# Patient Record
(demographics unavailable — no encounter records)

---

## 2025-07-14 NOTE — ASSESSMENT
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 We reviewed her pathology and reviewed the significance of ER 0 to low negative breast cancer which behaves like a clinical triple negative breast cancer. While the tumor size was small, we explained that the tumor biology will usually warrant adjuvant treatment like chemotherapy to reduce her risk of breast cancer recurrence. We reviewed that the Oncotype would review her risk of breast cancer distant recurrence over the next 9 years with her being on endocrine therapy. We reviewed her concerns about chemotherapy and explained supportive measures would be in place to lessen side effects from treatment. We gave her daughter written information about TC every 3 weeks for 4 times. At this time, she is willing to have us review Oncotype results once they return. We reviewed radiation therapy to the lumpectomy site which she is amenable to: she wants to have at [facility] and will send referral to radiation oncology. We reviewed ASCO 2024 data showing even low ER positive may benefit from endocrine therapy and would recommend endocrine therapy once a day after radiation. We reviewed their questions about immunotherapy and Parp inhibitor. Explained that Keytruda is currently only FDA approved for TNBC that is greater than 2 cm or LN positive. Also reviewed Lynparza data for pts with TNBC more than 2 cm or LN positive. Questions answered to her satisfaction. She is amenable to consider estrogen blocking therapy. We will call her and her daughter once Oncotype results return.

## 2025-07-14 NOTE — CONSULT LETTER
[Dear  ___] : Dear  [unfilled], [Consult Letter:] : I had the pleasure of evaluating your patient, [unfilled]. [( Thank you for referring [unfilled] for consultation for _____ )] : Thank you for referring [unfilled] for consultation for [unfilled] [Please see my note below.] : Please see my note below. [Consult Closing:] : Thank you very much for allowing me to participate in the care of this patient.  If you have any questions, please do not hesitate to contact me. [Sincerely,] : Sincerely, [FreeTextEntry2] : Alison Shafer MD [address] [place] [address], [address] [place] [phone] [FreeTextEntry3] : Martinez Alfonso MD Attending [facility]  [DrErnesto  ___] : Dr. PAEZ

## 2025-07-14 NOTE — PHYSICAL EXAM
[Fully active, able to carry on all pre-disease performance without restriction] : Status 0 - Fully active, able to carry on all pre-disease performance without restriction [Normal] : affect appropriate [de-identified] : Right upper outer lumpectomy and SLNB scar healed; no calor or erythema  [de-identified] : strengh BUE and BLE 5/5

## 2025-07-14 NOTE — HISTORY OF PRESENT ILLNESS
[de-identified] : Age 78: right breast cancer  Screen detected: had screening mammogram done on 5/13/2025 which showed subtle spiculations seen in the superior aspect between 11:00 and 12:00. She had right breast 11:00 u/s guided biopsy done on 5/22/2025. The pathology showed invasive ductal carcinoma poorly differentiated, SBR 8/9, ER negative, OK negative, Her2 negative, Ki67 30%. MRI of the breast done on 5/27/2025 showed right 1.1 cm enhancing mass consistent with biopsy proven carcinoma and no axillary adenopathy. She had genetic testing earlier due to FH of breast cancer (sister, brother and daughter) and has BRCA 2 mutation on surveillance. She was interested in breast conservation surgery and underwent right lumpectomy with SLNB with Dr Shafer on 6/25/2025. The pathology showed invasive ductal carcinoma measuring 9 mm, DCIS measuring 12 mm, and negative SLNB (0/2). IHC showed low ER positive 1 to 10%, OK negative, Her2 negative. Oncotype sent by Dr Shafer.  [de-identified] :  invasive ductal carcinoma poorly differentiated ER negative, SC negative, Her2 negative [de-identified] : Genetic testing: BRCA 2 positive  [de-identified] : She is present to review adjuvant treatment recommendations. She tolerated surgery well. Enjoys good health and lives with her . Denies any recent falls or unsteadiness. No HA or cough or back pain. She has had family members who had past chemotherapy and does not want chemotherapy. She wants to know what additional options can be considered for her case. She will be going on vacation and will return 8/4/2025. Her daughter is helping her with any visits and also interested in radiation oncology evaluation.

## 2025-07-14 NOTE — CONSULT LETTER
[Dear  ___] : Dear  [unfilled], [Consult Letter:] : I had the pleasure of evaluating your patient, [unfilled]. [( Thank you for referring [unfilled] for consultation for _____ )] : Thank you for referring [unfilled] for consultation for [unfilled] [Please see my note below.] : Please see my note below. [Consult Closing:] : Thank you very much for allowing me to participate in the care of this patient.  If you have any questions, please do not hesitate to contact me. [Sincerely,] : Sincerely, [FreeTextEntry2] : Alison Shafer MD [address] [place] [address], [address] [place] [phone] [FreeTextEntry3] : Martinez Alfonso MD Attending [facility]  [DrErnesto  ___] : Dr. PAEZ

## 2025-07-14 NOTE — ASSESSMENT
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 We reviewed her pathology and reviewed the significance of ER 0 to low negative breast cancer which behaves like a clinical triple negative breast cancer. While the tumor size was small, we explained that the tumor biology will usually warrant adjuvant treatment like chemotherapy to reduce her risk of breast cancer recurrence. We reviewed that the Oncotype would review her risk of breast cancer distant recurrence over the next 9 years with her being on endocrine therapy. We reviewed her concerns about chemotherapy and explained supportive measures would be in place to lessen side effects from treatment. We gave her daughter written information about TC every 3 weeks for 4 times. At this time, she is willing to have us review Oncotype results once they return. We reviewed radiation therapy to the lumpectomy site which she is amenable to: she wants to have at [facility] and will send referral to radiation oncology. We reviewed ASCO 2024 data showing even low ER positive may benefit from endocrine therapy and would recommend endocrine therapy once a day after radiation. We reviewed their questions about immunotherapy and Parp inhibitor. Explained that Keytruda is currently only FDA approved for TNBC that is greater than 2 cm or LN positive. Also reviewed Lynparza data for pts with TNBC more than 2 cm or LN positive. Questions answered to her satisfaction. She is amenable to consider estrogen blocking therapy. We will call her and her daughter once Oncotype results return.

## 2025-07-14 NOTE — PHYSICAL EXAM
[Fully active, able to carry on all pre-disease performance without restriction] : Status 0 - Fully active, able to carry on all pre-disease performance without restriction [Normal] : affect appropriate [de-identified] : Right upper outer lumpectomy and SLNB scar healed; no calor or erythema  [de-identified] : strengh BUE and BLE 5/5

## 2025-07-14 NOTE — HISTORY OF PRESENT ILLNESS
[de-identified] : Age 78: right breast cancer  Screen detected: had screening mammogram done on 5/13/2025 which showed subtle spiculations seen in the superior aspect between 11:00 and 12:00. She had right breast 11:00 u/s guided biopsy done on 5/22/2025. The pathology showed invasive ductal carcinoma poorly differentiated, SBR 8/9, ER negative, TN negative, Her2 negative, Ki67 30%. MRI of the breast done on 5/27/2025 showed right 1.1 cm enhancing mass consistent with biopsy proven carcinoma and no axillary adenopathy. She had genetic testing earlier due to FH of breast cancer (sister, brother and daughter) and has BRCA 2 mutation on surveillance. She was interested in breast conservation surgery and underwent right lumpectomy with SLNB with Dr Shafer on 6/25/2025. The pathology showed invasive ductal carcinoma measuring 9 mm, DCIS measuring 12 mm, and negative SLNB (0/2). IHC showed low ER positive 1 to 10%, TN negative, Her2 negative. Oncotype sent by Dr Shafer.  [de-identified] :  invasive ductal carcinoma poorly differentiated ER negative, AK negative, Her2 negative [de-identified] : Genetic testing: BRCA 2 positive  [de-identified] : She is present to review adjuvant treatment recommendations. She tolerated surgery well. Enjoys good health and lives with her . Denies any recent falls or unsteadiness. No HA or cough or back pain. She has had family members who had past chemotherapy and does not want chemotherapy. She wants to know what additional options can be considered for her case. She will be going on vacation and will return 8/4/2025. Her daughter is helping her with any visits and also interested in radiation oncology evaluation.